# Patient Record
Sex: FEMALE | Race: WHITE | NOT HISPANIC OR LATINO
[De-identification: names, ages, dates, MRNs, and addresses within clinical notes are randomized per-mention and may not be internally consistent; named-entity substitution may affect disease eponyms.]

---

## 2020-10-21 ENCOUNTER — APPOINTMENT (OUTPATIENT)
Dept: MATERNAL FETAL MEDICINE | Facility: CLINIC | Age: 30
End: 2020-10-21
Payer: COMMERCIAL

## 2020-10-21 ENCOUNTER — APPOINTMENT (OUTPATIENT)
Dept: MATERNAL FETAL MEDICINE | Facility: CLINIC | Age: 30
End: 2020-10-21

## 2020-10-21 ENCOUNTER — APPOINTMENT (OUTPATIENT)
Dept: ANTEPARTUM | Facility: CLINIC | Age: 30
End: 2020-10-21

## 2020-10-21 PROCEDURE — 99072 ADDL SUPL MATRL&STAF TM PHE: CPT

## 2020-10-21 PROCEDURE — 76816 OB US FOLLOW-UP PER FETUS: CPT

## 2020-10-21 PROCEDURE — 76820 UMBILICAL ARTERY ECHO: CPT

## 2020-10-21 PROCEDURE — 76819 FETAL BIOPHYS PROFIL W/O NST: CPT

## 2020-12-23 ENCOUNTER — APPOINTMENT (OUTPATIENT)
Dept: MATERNAL FETAL MEDICINE | Facility: CLINIC | Age: 30
End: 2020-12-23

## 2022-02-14 ENCOUNTER — EMERGENCY (EMERGENCY)
Facility: HOSPITAL | Age: 32
LOS: 0 days | Discharge: HOME | End: 2022-02-14
Attending: EMERGENCY MEDICINE | Admitting: EMERGENCY MEDICINE
Payer: COMMERCIAL

## 2022-02-14 VITALS
OXYGEN SATURATION: 100 % | RESPIRATION RATE: 18 BRPM | TEMPERATURE: 98 F | SYSTOLIC BLOOD PRESSURE: 122 MMHG | WEIGHT: 156.09 LBS | DIASTOLIC BLOOD PRESSURE: 76 MMHG | HEART RATE: 85 BPM

## 2022-02-14 DIAGNOSIS — Z3A.01 LESS THAN 8 WEEKS GESTATION OF PREGNANCY: ICD-10-CM

## 2022-02-14 DIAGNOSIS — R10.30 LOWER ABDOMINAL PAIN, UNSPECIFIED: ICD-10-CM

## 2022-02-14 DIAGNOSIS — O26.891 OTHER SPECIFIED PREGNANCY RELATED CONDITIONS, FIRST TRIMESTER: ICD-10-CM

## 2022-02-14 DIAGNOSIS — O20.9 HEMORRHAGE IN EARLY PREGNANCY, UNSPECIFIED: ICD-10-CM

## 2022-02-14 DIAGNOSIS — O46.91 ANTEPARTUM HEMORRHAGE, UNSPECIFIED, FIRST TRIMESTER: ICD-10-CM

## 2022-02-14 LAB
ALBUMIN SERPL ELPH-MCNC: 4.6 G/DL — SIGNIFICANT CHANGE UP (ref 3.5–5.2)
ALP SERPL-CCNC: 72 U/L — SIGNIFICANT CHANGE UP (ref 30–115)
ALT FLD-CCNC: 20 U/L — SIGNIFICANT CHANGE UP (ref 0–41)
ANION GAP SERPL CALC-SCNC: 7 MMOL/L — SIGNIFICANT CHANGE UP (ref 7–14)
APPEARANCE UR: ABNORMAL
AST SERPL-CCNC: 17 U/L — SIGNIFICANT CHANGE UP (ref 0–41)
BACTERIA # UR AUTO: NEGATIVE — SIGNIFICANT CHANGE UP
BASOPHILS # BLD AUTO: 0.03 K/UL — SIGNIFICANT CHANGE UP (ref 0–0.2)
BASOPHILS NFR BLD AUTO: 0.4 % — SIGNIFICANT CHANGE UP (ref 0–1)
BILIRUB SERPL-MCNC: 0.6 MG/DL — SIGNIFICANT CHANGE UP (ref 0.2–1.2)
BILIRUB UR-MCNC: NEGATIVE — SIGNIFICANT CHANGE UP
BLD GP AB SCN SERPL QL: SIGNIFICANT CHANGE UP
BUN SERPL-MCNC: 9 MG/DL — LOW (ref 10–20)
CALCIUM SERPL-MCNC: 9.1 MG/DL — SIGNIFICANT CHANGE UP (ref 8.5–10.1)
CHLORIDE SERPL-SCNC: 103 MMOL/L — SIGNIFICANT CHANGE UP (ref 98–110)
CO2 SERPL-SCNC: 25 MMOL/L — SIGNIFICANT CHANGE UP (ref 17–32)
COLOR SPEC: YELLOW — SIGNIFICANT CHANGE UP
CREAT SERPL-MCNC: 0.7 MG/DL — SIGNIFICANT CHANGE UP (ref 0.7–1.5)
DIFF PNL FLD: ABNORMAL
EOSINOPHIL # BLD AUTO: 0.31 K/UL — SIGNIFICANT CHANGE UP (ref 0–0.7)
EOSINOPHIL NFR BLD AUTO: 4.2 % — SIGNIFICANT CHANGE UP (ref 0–8)
EPI CELLS # UR: 1 /HPF — SIGNIFICANT CHANGE UP (ref 0–5)
GLUCOSE SERPL-MCNC: 97 MG/DL — SIGNIFICANT CHANGE UP (ref 70–99)
GLUCOSE UR QL: NEGATIVE — SIGNIFICANT CHANGE UP
HCG SERPL-ACNC: 65.3 MIU/ML — HIGH
HCT VFR BLD CALC: 37.9 % — SIGNIFICANT CHANGE UP (ref 37–47)
HGB BLD-MCNC: 13.1 G/DL — SIGNIFICANT CHANGE UP (ref 12–16)
HYALINE CASTS # UR AUTO: 0 /LPF — SIGNIFICANT CHANGE UP (ref 0–7)
IMM GRANULOCYTES NFR BLD AUTO: 0.3 % — SIGNIFICANT CHANGE UP (ref 0.1–0.3)
KETONES UR-MCNC: NEGATIVE — SIGNIFICANT CHANGE UP
LEUKOCYTE ESTERASE UR-ACNC: NEGATIVE — SIGNIFICANT CHANGE UP
LYMPHOCYTES # BLD AUTO: 2.02 K/UL — SIGNIFICANT CHANGE UP (ref 1.2–3.4)
LYMPHOCYTES # BLD AUTO: 27.4 % — SIGNIFICANT CHANGE UP (ref 20.5–51.1)
MCHC RBC-ENTMCNC: 32.1 PG — HIGH (ref 27–31)
MCHC RBC-ENTMCNC: 34.6 G/DL — SIGNIFICANT CHANGE UP (ref 32–37)
MCV RBC AUTO: 92.9 FL — SIGNIFICANT CHANGE UP (ref 81–99)
MONOCYTES # BLD AUTO: 0.73 K/UL — HIGH (ref 0.1–0.6)
MONOCYTES NFR BLD AUTO: 9.9 % — HIGH (ref 1.7–9.3)
NEUTROPHILS # BLD AUTO: 4.25 K/UL — SIGNIFICANT CHANGE UP (ref 1.4–6.5)
NEUTROPHILS NFR BLD AUTO: 57.8 % — SIGNIFICANT CHANGE UP (ref 42.2–75.2)
NITRITE UR-MCNC: NEGATIVE — SIGNIFICANT CHANGE UP
NRBC # BLD: 0 /100 WBCS — SIGNIFICANT CHANGE UP (ref 0–0)
PH UR: 6 — SIGNIFICANT CHANGE UP (ref 5–8)
PLATELET # BLD AUTO: 257 K/UL — SIGNIFICANT CHANGE UP (ref 130–400)
POTASSIUM SERPL-MCNC: 3.8 MMOL/L — SIGNIFICANT CHANGE UP (ref 3.5–5)
POTASSIUM SERPL-SCNC: 3.8 MMOL/L — SIGNIFICANT CHANGE UP (ref 3.5–5)
PROT SERPL-MCNC: 6.7 G/DL — SIGNIFICANT CHANGE UP (ref 6–8)
PROT UR-MCNC: SIGNIFICANT CHANGE UP
RBC # BLD: 4.08 M/UL — LOW (ref 4.2–5.4)
RBC # FLD: 11.8 % — SIGNIFICANT CHANGE UP (ref 11.5–14.5)
RBC CASTS # UR COMP ASSIST: >720 /HPF — HIGH (ref 0–4)
SODIUM SERPL-SCNC: 135 MMOL/L — SIGNIFICANT CHANGE UP (ref 135–146)
SP GR SPEC: 1.02 — SIGNIFICANT CHANGE UP (ref 1.01–1.03)
UROBILINOGEN FLD QL: SIGNIFICANT CHANGE UP
WBC # BLD: 7.36 K/UL — SIGNIFICANT CHANGE UP (ref 4.8–10.8)
WBC # FLD AUTO: 7.36 K/UL — SIGNIFICANT CHANGE UP (ref 4.8–10.8)
WBC UR QL: 7 /HPF — HIGH (ref 0–5)

## 2022-02-14 PROCEDURE — 99285 EMERGENCY DEPT VISIT HI MDM: CPT

## 2022-02-14 PROCEDURE — 76830 TRANSVAGINAL US NON-OB: CPT | Mod: 26

## 2022-02-14 PROCEDURE — 99212 OFFICE O/P EST SF 10 MIN: CPT

## 2022-02-14 RX ORDER — SODIUM CHLORIDE 9 MG/ML
1000 INJECTION INTRAMUSCULAR; INTRAVENOUS; SUBCUTANEOUS ONCE
Refills: 0 | Status: COMPLETED | OUTPATIENT
Start: 2022-02-14 | End: 2022-02-14

## 2022-02-14 RX ADMIN — SODIUM CHLORIDE 1000 MILLILITER(S): 9 INJECTION INTRAMUSCULAR; INTRAVENOUS; SUBCUTANEOUS at 01:44

## 2022-02-14 NOTE — ED ADULT NURSE NOTE - OBJECTIVE STATEMENT
patient complaints of vaginal bleeding. Patient reports being 5 weeks pregnant. Patient bleeding since 11:30pm and 1 pad used.

## 2022-02-14 NOTE — ED PROVIDER NOTE - NSFOLLOWUPCLINICS_GEN_ALL_ED_FT
[facility]  OB/GYN  [address] [place] [address]  Phone: [phone]  Fax:   Follow Up Time: 1-3 Days

## 2022-02-14 NOTE — ED PROVIDER NOTE - PATIENT PORTAL LINK FT
You can access the FollowMyHealth Patient Portal offered by [facility] by registering at the following website: [email or web address]. By joining [facility]’s FollowMyHealth portal, you will also be able to view your health information using other applications (apps) compatible with our system.

## 2022-02-14 NOTE — CONSULT NOTE ADULT - ASSESSMENT
32 yo  with LMP 22 @ 5w3d presenting with vaginal bleeding, bhcg of 65.3 and no visualized IUP, r/o SAB vs. early IUP, currently clinically and hemodynamically stable.    -no acute gyn intervention  -discussed differential diagnosis including: early IUP, abnormal IUP, and ectopic pregnancy  -repeat bhcg in 48 hours, scripts given  -follow-up @ scheduled OB visit today  -tylenol for pain  -bleeding and pain precautions given  -dispo per ED    Dr. Farrell and Dr. Rojas aware.     A/P: 30 yo  with LMP 22 at 5w3d GA presenting with vaginal bleeding, r/o SAB vs. early IUP, currently clinically and hemodynamically stable.  -no acute gyn intervention  -discussed differential diagnosis including: early IUP, abnormal IUP, and ectopic pregnancy  -repeat bhcg in 48 hours, scripts given  -follow-up at scheduled OB visit today with PMD  -tylenol for pain  -bleeding and pain precautions given  -dispo per ED    Dr. Farrell and Dr. Rojas aware.

## 2022-02-14 NOTE — ED PROVIDER NOTE - NS ED ROS FT
Review of Systems  Constitutional:  No fever, chills.  Eyes:  No visual changes, eye pain, or discharge.  ENMT:  No hearing changes, pain, or discharge. No nasal congestion, discharge, or bleeding. No throat pain, swelling, or difficulty swallowing.  Cardiac:  No chest pain, palpitations, syncope, or edema.  Respiratory:  No dyspnea, cough. No hemoptysis.  GI:  No nausea, vomiting, diarrhea. (+) lower abd cramping  :  No dysuria, hematuria, frequency, or burning. (+) vaginal spotting  MS:  No back pain.  Skin:  No skin rash, pruritis, jaundice, or lesions.  Neuro:  No headache, dizziness, loss of sensation, or focal weakness.  No change in mental status.

## 2022-02-14 NOTE — CONSULT NOTE ADULT - SUBJECTIVE AND OBJECTIVE BOX
PGY 1 note:    Chief Complaint: vaginal bleeding    HPI: 30 yo  with LMP 22 @ 5w3d presented to the ED complaining of vaginal bleeding since Friday. She states at the time it was just spotting but, since 1130pm this evening has become heavier prompting her visit to the ED. She used 2 pads from 1130pm to 4am and notes some dime sized clots. She states she also feels some abdominal cramping, 3/10 similar to her period cramps. She follows with Dr. Cole @ [facility] for obgyn care. She has an appointment with him today. Denies fevers, chills, lightheadedness, dizziness, SOB, CP, palpitations, nausea, vomiting, diarrhea, dysuria, urinary frequency or urinary urgency.    Ob/Gyn History:                   LMP - 22                  Cycle Length - regular   Denies history of ovarian cysts, uterine fibroids, abnormal paps, or STIs    Home Medications: none    Allergies    No Known Allergies    PAST MEDICAL & SURGICAL HISTORY: none      FAMILY HISTORY: none      SOCIAL HISTORY: Denies cigarette use, alcohol use, or illicit drug use    Vital Signs Last 24 Hrs  T(F): 98.4 (2022 00:30), Max: 98.4 (2022 00:30)  HR: 85 (2022 00:30) (85 - 85)  BP: 122/76 (2022 00:30) (122/76 - 122/76)  RR: 18 (2022 00:30) (18 - 18)    Weight (kg): 70.8 (22 @ 00:30)    General Appearance - AAOx3, NAD  Heart - S1S2 regular rate and rhythm  Lung - CTA Bilaterally  Abdomen - Soft, nontender, nondistended, no rebound, no rigidity, no guarding, bowel sounds present    GYN/Pelvis:    Labia Majora - Normal  Labia Minora - Normal  Clitoris - Normal  Urethra - Normal  Vagina - Normal. 5cc dark red blood in vaginal vault.  Cervix - Normal. no active bleeding from os. os appears closed.    Uterus:  Size - Normal  Tenderness - None  Mass - None  Freely mobile    Adnexa:  Masses - None  Tenderness - None    Meds:   sodium chloride 0.9% Bolus 1000 milliLiter(s) IV Bolus once      Weight (kg): 70.8 (22 @ 00:30)    LABS:                        13.1   7.36  )-----------( 257      ( 2022 01:00 )             37.9     HCG Quantitative, Serum: 65.3 mIU/mL (22 @ 01:00)    ABO RH Interpretation: O POS (22 @ 01:00)  Antibody Screen: NEG (22 @ 01:00)        135  |  103  |  9<L>  ----------------------------<  97  3.8   |  25  |  0.7    Ca    9.1      2022 01:00    TPro  6.7  /  Alb  4.6  /  TBili  0.6  /  DBili  x   /  AST  17  /  ALT  20  /  AlkPhos  72        Urinalysis Basic - ( 2022 01:00 )    Color: Yellow / Appearance: Slightly Turbid / S.016 / pH: x  Gluc: x / Ketone: Negative  / Bili: Negative / Urobili: <2 mg/dL   Blood: x / Protein: Trace / Nitrite: Negative   Leuk Esterase: Negative / RBC: >720 /HPF / WBC 7 /HPF   Sq Epi: x / Non Sq Epi: 1 /HPF / Bacteria: Negative      RADIOLOGY & ADDITIONAL STUDIES:    < from: US Transvaginal (22 @ 02:45) >  US TRANSVAGINAL                          PROCEDURE DATE:  2022          INTERPRETATION:  CLINICAL INFORMATION: Vaginal bleeding, pregnant.    LMP: 2022    COMPARISON: None available.    TECHNIQUE:  Endovaginal and transabdominal pelvic sonogram. Color and Spectral   Doppler was performed.    FINDINGS:    Uterus: 10.0 cm x 4.5 cm x 6.2 cm. Endometrium measures up to 1.6 cm in   thickness, upper limits of normal.  No evidence of intrauterine or extrauterine pregnancy.  Right ovary: 2.5 cm x 2.5 cm x 2.8 cm. Within normal limits.  Left ovary: 3.2 cm x 2.2 cm x 3.8 cm. Left ovarian 1.8 cm probable corpus   luteal cyst  Bilateral ovarian Doppler flow demonstrated.  Fluid: None.    IMPRESSION:    1. No evidence ofintrauterine or extrauterine pregnancy. Follow-up beta   hCG, pelvic ultrasound recommended.    2. Left ovarian 1.8 cm probable corpus luteal cyst    --- End of Report ---      < end of copied text >   GYN CONSULT    Chief Complaint: vaginal bleeding    HPI: 30 yo  with LMP 22 at 5w3d GA presented to the ED complaining of vaginal bleeding since Friday. She states at the time it was just spotting but, since  it has become heavier prompting her visit to the ED. She used 2 pads from 2330pm to 0400 and notes some dime sized clots. She states she also feels some abdominal cramping, 3/10 similar to her period cramps. She follows with Dr. Cole at [facility] for obgyn care. She has an appointment with him today. Denies fevers, chills, lightheadedness, dizziness, SOB, CP, palpitations, nausea, vomiting, diarrhea, dysuria, urinary frequency or urinary urgency.    Ob/Gyn History:   (1 FT )                LMP - 22                  Cycle Length - regular   Denies history of ovarian cysts, uterine fibroids, abnormal paps, or STIs    Home Medications: none    Allergies: No Known Allergies    PAST MEDICAL & SURGICAL HISTORY: none    FAMILY HISTORY: none    SOCIAL HISTORY: Denies cigarette use, alcohol use, or illicit drug use    Vital Signs Last 24 Hrs  T(F): 98.4 (2022 00:30), Max: 98.4 (2022 00:30)  HR: 85 (2022 00:30) (85 - 85)  BP: 122/76 (2022 00:30) (122/76 - 122/76)  RR: 18 (2022 00:30) (18 - 18)    Weight (kg): 70.8 (22 @ 00:30)    General Appearance - AAOx3, NAD  Heart - S1S2 regular rate and rhythm  Lung - CTA Bilaterally  Abdomen - Soft, nontender, nondistended, no rebound, no rigidity, no guarding, bowel sounds present    GYN/Pelvis:  Labia Majora - Normal  Labia Minora - Normal  Clitoris - Normal  Urethra - Normal  Vagina - Normal. 5cc dark red blood in vaginal vault.  Cervix - Normal. no active bleeding from os. os appears closed.    Uterus:  Size - Normal  Tenderness - None  Mass - None  Freely mobile    Adnexa:  Masses - None  Tenderness - None    Meds:   sodium chloride 0.9% Bolus 1000 milliLiter(s) IV Bolus once      Weight (kg): 70.8 (22 @ 00:30)    LABS:                        13.1   7.36  )-----------( 257      ( 2022 01:00 )             37.9     HCG Quantitative, Serum: 65.3 mIU/mL (22 @ 01:00)    ABO RH Interpretation: O POS (22 @ 01:00)  Antibody Screen: NEG (22 @ 01:00)        135  |  103  |  9<L>  ----------------------------<  97  3.8   |  25  |  0.7    Ca    9.1      2022 01:00    TPro  6.7  /  Alb  4.6  /  TBili  0.6  /  DBili  x   /  AST  17  /  ALT  20  /  AlkPhos  72        Urinalysis Basic - ( 2022 01:00 )    Color: Yellow / Appearance: Slightly Turbid / S.016 / pH: x  Gluc: x / Ketone: Negative  / Bili: Negative / Urobili: <2 mg/dL   Blood: x / Protein: Trace / Nitrite: Negative   Leuk Esterase: Negative / RBC: >720 /HPF / WBC 7 /HPF   Sq Epi: x / Non Sq Epi: 1 /HPF / Bacteria: Negative      RADIOLOGY & ADDITIONAL STUDIES:    < from: US Transvaginal (22 @ 02:45) >  US TRANSVAGINAL                          PROCEDURE DATE:  2022          INTERPRETATION:  CLINICAL INFORMATION: Vaginal bleeding, pregnant.    LMP: 2022    COMPARISON: None available.    TECHNIQUE:  Endovaginal and transabdominal pelvic sonogram. Color and Spectral   Doppler was performed.    FINDINGS:    Uterus: 10.0 cm x 4.5 cm x 6.2 cm. Endometrium measures up to 1.6 cm in   thickness, upper limits of normal.  No evidence of intrauterine or extrauterine pregnancy.  Right ovary: 2.5 cm x 2.5 cm x 2.8 cm. Within normal limits.  Left ovary: 3.2 cm x 2.2 cm x 3.8 cm. Left ovarian 1.8 cm probable corpus   luteal cyst  Bilateral ovarian Doppler flow demonstrated.  Fluid: None.    IMPRESSION:    1. No evidence ofintrauterine or extrauterine pregnancy. Follow-up beta   hCG, pelvic ultrasound recommended.    2. Left ovarian 1.8 cm probable corpus luteal cyst    --- End of Report ---      < end of copied text >

## 2022-02-14 NOTE — ED PROVIDER NOTE - CLINICAL SUMMARY MEDICAL DECISION MAKING FREE TEXT BOX
Labs reviewed, hCG 65.  UA positive for blood.  Rh+.  Pelvic sono with no IUP.  Patient seen and evaluated by GYN, to DC home with plan for repeat hCG in 48 hours.  Patient told return to ER for increased bleeding, pain, fever, dizziness, syncope/near syncope, or any other new/concerning symptoms.  Patient understands and agrees with plan.

## 2022-02-14 NOTE — ED ADULT TRIAGE NOTE - CHIEF COMPLAINT QUOTE
pt sts she just found out a few days ago that she was pregnant and today she started bleeding vaginally and abdominal cramping

## 2022-02-14 NOTE — CONSULT NOTE ADULT - ATTENDING COMMENTS
30 yo  with LMP 22 at 5w3d presented with vaginal bleeding, possible SAB or early IUP, clinically and hemodynamically stable.  Patient well appearing, minimal old blood on exam, nontender, no pain.  Ultrasound with no evidence of intrauterine or extrauterine pregnancy, likely due to early gestational age. Suspicion for ectopic pregnancy very low  -Discussed differential diagnoses of early IUP, abnormal IUP, and ectopic pregnancy  -Rx for repeat bhcg in 48 hours given  -Will follow patient until care transferred to Dr. Ridley, keep scheduled appointment today.  -disposition per ED team, precautions discussed with patient

## 2022-02-14 NOTE — ED PROVIDER NOTE - OBJECTIVE STATEMENT
31-year-old , LMP 2022, with no significant past medical history presents to the ED complaining of vaginal spotting since Friday.  Today bleeding became heavier and she started passing small clots.  Patient also complaining of mild lower abdominal cramping.  She follows with Dr. Cole at [facility].  She states she has follow-up appointment today, but because she developed pain and heavier bleeding she came to ED.  She denies other complaints. Pt denies fever, chills, nausea, vomiting, diarrhea, headache, dizziness, weakness, chest pain, SOB, back pain, LOC, trauma, urinary symptoms, cough, calf pain/swelling, recent travel, recent surgery.

## 2022-02-14 NOTE — CHART NOTE - NSCHARTNOTEFT_GEN_A_CORE
PGY1 Note    Ms Ahn is a 30yo F who presented to the ED on  with vaginal bleeding.     HPI: 32 yo  with LMP 22 at 5w3d GA presented to the ED complaining of vaginal bleeding since Friday. She states at the time it was just spotting but, since  it has become heavier prompting her visit to the ED. She used 2 pads from 2330pm to 0400 and notes some dime sized clots. She states she also feels some abdominal cramping, 3/10 similar to her period cramps. She follows with Dr. Cole at [facility] obgyn care. She has an appointment with him today. Denies fevers, chills, lightheadedness, dizziness, SOB, CP, palpitations, nausea, vomiting, diarrhea, dysuria, urinary frequency or urinary urgency.    Labs:  bhcg 65.3, O pos, 7.36>13.1/37.9<257, 135/3.8/103/25/9/0.7<97, AST/ALT     TVUS : Uterus: 10.0 cm x 4.5 cm x 6.2 cm. Endometrium measures up to 1.6 cm in thickness, upper limits of normal. No evidence of intrauterine or extrauterine pregnancy. Right ovary: 2.5 cm x 2.5 cm x 2.8 cm. Within normal limits. Left ovary: 3.2 cm x 2.2 cm x 3.8 cm. Left ovarian 1.8 cm probable corpus  luteal cyst. Bilateral ovarian Doppler flow demonstrated. Fluid: None.    Called Dr. Cole's office today, confirmed that patient followed up for an appointment today. Dr Cole to assume care from now on. GYN team will no longer follow.     Dr Garcia and Dr Soto aware

## 2022-02-14 NOTE — ED PROVIDER NOTE - MDM ORDERS SUBMITTED SELECTION
No Repair - Repaired With Adjacent Surgical Defect Text (Leave Blank If You Do Not Want): After obtaining clear surgical margins the defect was repaired concurrently with another surgical defect which was in close approximation. Labs/Imaging Studies/Medications

## 2022-02-14 NOTE — ED PROVIDER NOTE - PROGRESS NOTE DETAILS
Pt evaluated by OBGYN, recommend outpt follow-up for rpt beta/sono. Pt given return precautions, agreeable to dc.

## 2022-02-14 NOTE — ED PROVIDER NOTE - ATTENDING CONTRIBUTION TO CARE
30 y/o pregnant woman, , LMP 1/7/22 @ 5w 3d by dates in ER with c/o VB.  started having some spotting 2 days ago, today developed intermittent pelvic cramping and heavier VB, similar to a period.  no cramping now.  no urinary symptoms.  no n/v/d.  no f/c.  no cp/sob/palpitations,  no ha/dizziness/syncope/near syncope.  has not been to see her gyn yet.  PE - nad, nc/at, eomi, perrl, op - clear, mmm, neck supple, cta b/l, no w/r/r, rrr, abd- soft, nt/nd, nabs, no LE swelling/tenderness, A&O x 3, no focal neuro deficits.  -check labs, ua, pelvic sono.

## 2022-02-14 NOTE — ED PROVIDER NOTE - NSFOLLOWUPINSTRUCTIONS_ED_ALL_ED_FT
Vaginal Bleeding During Pregnancy, First Trimester    A small amount of bleeding (spotting) from the vagina is relatively common in early pregnancy. It usually stops on its own. Various things may cause bleeding or spotting in early pregnancy. Some bleeding may be related to the pregnancy, and some may not. In most cases, the bleeding is normal and is not a problem. However, bleeding can also be a sign of something serious. Be sure to tell your health care provider about any vaginal bleeding right away.    Some possible causes of vaginal bleeding during the first trimester include:     Infection or inflammation of the cervix.  Growths (polyps) on the cervix.  Miscarriage or threatened miscarriage.  Pregnancy tissue has developed outside of the uterus and in a fallopian tube (tubal pregnancy).  Tiny cysts have developed in the uterus instead of pregnancy tissue (molar pregnancy).    HOME CARE INSTRUCTIONS  Watch your condition for any changes. The following actions may help to lessen any discomfort you are feeling:    Follow your health care provider's instructions for limiting your activity. If your health care provider orders bed rest, you may need to stay in bed and only get up to use the bathroom. However, your health care provider may allow you to continue light activity.  If needed, make plans for someone to help with your regular activities and responsibilities while you are on bed rest.  Keep track of the number of pads you use each day, how often you change pads, and how soaked (saturated) they are. Write this down.  Do not use tampons. Do not douche.  Do not have sexual intercourse or orgasms until approved by your health care provider.  If you pass any tissue from your vagina, save the tissue so you can show it to your health care provider.  Only take over-the-counter or prescription medicines as directed by your health care provider.   Do not take aspirin because it can make you bleed.  Keep all follow-up appointments as directed by your health care provider.    SEEK MEDICAL CARE IF:  You have any vaginal bleeding during any part of your pregnancy.  You have cramps or labor pains.   You have a fever, not controlled by medicine.     SEEK IMMEDIATE MEDICAL CARE IF:  You have severe cramps in your back or belly (abdomen).  You pass large clots or tissue from your vagina.   Your bleeding increases.  You feel light-headed or weak, or you have fainting episodes.  You have chills.  You are leaking fluid or have a gush of fluid from your vagina.  You pass out while having a bowel movement.     MAKE SURE YOU:  Understand these instructions.  Will watch your condition.  Will get help right away if you are not doing well or get worse.    ADDITIONAL NOTES AND INSTRUCTIONS    Please follow up with your Primary MD in 24-48 hr.  Seek immediate medical care for any new/worsening signs or symptoms.

## 2022-02-14 NOTE — ED ADULT NURSE NOTE - CAS TRG GEN SKIN CONDITION
TC called PT and scheduled PT for virtual visit with PCP 12/31 at 8am. PT confirmed appointment day and time   Warm

## 2022-02-15 LAB
CULTURE RESULTS: SIGNIFICANT CHANGE UP
SPECIMEN SOURCE: SIGNIFICANT CHANGE UP

## 2022-05-12 PROBLEM — Z00.00 ENCOUNTER FOR PREVENTIVE HEALTH EXAMINATION: Status: ACTIVE | Noted: 2022-05-12

## 2023-11-21 ENCOUNTER — NON-APPOINTMENT (OUTPATIENT)
Age: 33
End: 2023-11-21

## 2024-01-03 ENCOUNTER — NON-APPOINTMENT (OUTPATIENT)
Age: 34
End: 2024-01-03
